# Patient Record
Sex: MALE | Race: WHITE
[De-identification: names, ages, dates, MRNs, and addresses within clinical notes are randomized per-mention and may not be internally consistent; named-entity substitution may affect disease eponyms.]

---

## 2020-06-13 ENCOUNTER — HOSPITAL ENCOUNTER (EMERGENCY)
Dept: HOSPITAL 62 - ER | Age: 35
Discharge: HOME | End: 2020-06-13
Payer: SELF-PAY

## 2020-06-13 VITALS — DIASTOLIC BLOOD PRESSURE: 75 MMHG | SYSTOLIC BLOOD PRESSURE: 123 MMHG

## 2020-06-13 DIAGNOSIS — L03.116: ICD-10-CM

## 2020-06-13 DIAGNOSIS — F17.200: ICD-10-CM

## 2020-06-13 DIAGNOSIS — Z88.0: ICD-10-CM

## 2020-06-13 DIAGNOSIS — F12.10: ICD-10-CM

## 2020-06-13 DIAGNOSIS — L02.413: Primary | ICD-10-CM

## 2020-06-13 LAB
ADD MANUAL DIFF: NO
ALBUMIN SERPL-MCNC: 3.5 G/DL (ref 3.5–5)
ALP SERPL-CCNC: 62 U/L (ref 38–126)
ANION GAP SERPL CALC-SCNC: 4 MMOL/L (ref 5–19)
AST SERPL-CCNC: 40 U/L (ref 17–59)
BASOPHILS # BLD AUTO: 0.1 10^3/UL (ref 0–0.2)
BASOPHILS NFR BLD AUTO: 1.2 % (ref 0–2)
BILIRUB DIRECT SERPL-MCNC: 0.1 MG/DL (ref 0–0.4)
BILIRUB SERPL-MCNC: 0.4 MG/DL (ref 0.2–1.3)
BUN SERPL-MCNC: 9 MG/DL (ref 7–20)
CALCIUM: 8.8 MG/DL (ref 8.4–10.2)
CHLORIDE SERPL-SCNC: 106 MMOL/L (ref 98–107)
CO2 SERPL-SCNC: 27 MMOL/L (ref 22–30)
EOSINOPHIL # BLD AUTO: 0.4 10^3/UL (ref 0–0.6)
EOSINOPHIL NFR BLD AUTO: 3.7 % (ref 0–6)
ERYTHROCYTE [DISTWIDTH] IN BLOOD BY AUTOMATED COUNT: 12.9 % (ref 11.5–14)
GLUCOSE SERPL-MCNC: 84 MG/DL (ref 75–110)
HCT VFR BLD CALC: 43 % (ref 37.9–51)
HGB BLD-MCNC: 14.6 G/DL (ref 13.5–17)
LYMPHOCYTES # BLD AUTO: 2.3 10^3/UL (ref 0.5–4.7)
LYMPHOCYTES NFR BLD AUTO: 20.6 % (ref 13–45)
MCH RBC QN AUTO: 31.9 PG (ref 27–33.4)
MCHC RBC AUTO-ENTMCNC: 33.9 G/DL (ref 32–36)
MCV RBC AUTO: 94 FL (ref 80–97)
MONOCYTES # BLD AUTO: 0.9 10^3/UL (ref 0.1–1.4)
MONOCYTES NFR BLD AUTO: 7.8 % (ref 3–13)
NEUTROPHILS # BLD AUTO: 7.4 10^3/UL (ref 1.7–8.2)
NEUTS SEG NFR BLD AUTO: 66.7 % (ref 42–78)
PLATELET # BLD: 299 10^3/UL (ref 150–450)
POTASSIUM SERPL-SCNC: 4.2 MMOL/L (ref 3.6–5)
PROT SERPL-MCNC: 6.3 G/DL (ref 6.3–8.2)
RBC # BLD AUTO: 4.56 10^6/UL (ref 4.35–5.55)
TOTAL CELLS COUNTED % (AUTO): 100 %
WBC # BLD AUTO: 11.1 10^3/UL (ref 4–10.5)

## 2020-06-13 PROCEDURE — 83605 ASSAY OF LACTIC ACID: CPT

## 2020-06-13 PROCEDURE — 85025 COMPLETE CBC W/AUTO DIFF WBC: CPT

## 2020-06-13 PROCEDURE — 93971 EXTREMITY STUDY: CPT

## 2020-06-13 PROCEDURE — 36415 COLL VENOUS BLD VENIPUNCTURE: CPT

## 2020-06-13 PROCEDURE — 87040 BLOOD CULTURE FOR BACTERIA: CPT

## 2020-06-13 PROCEDURE — 99283 EMERGENCY DEPT VISIT LOW MDM: CPT

## 2020-06-13 PROCEDURE — 96375 TX/PRO/DX INJ NEW DRUG ADDON: CPT

## 2020-06-13 PROCEDURE — 80053 COMPREHEN METABOLIC PANEL: CPT

## 2020-06-13 PROCEDURE — 96365 THER/PROPH/DIAG IV INF INIT: CPT

## 2020-06-13 NOTE — ER DOCUMENT REPORT
ED Skin Rash/Insect Bite/Abscs





- General


Chief Complaint: Abscess


Stated Complaint: ABSCESS/RIGHT ARM, LEFT LEG


Time Seen by Provider: 06/13/20 15:50


Primary Care Provider: 


[facility] [Provider Group] - Follow up in 3-5 days (Call for 

recheck appointment in 2 days)


Mode of Arrival: Ambulatory


Information source: Patient


Notes: 





34-year-old male past medical history significant for substance abuse presents 

to the emergency room complaining of erythematous lesion to his right forearm 

that he noticed 4 days ago.  2 days ago he noticed a few lesions to his left leg

with some left leg swelling.  States the areas are painful.  He denies any 

trauma or injury.  Denies any IV drug use denies any fevers.  Tetanus is 

up-to-date.  No medications for symptoms.  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

section of a hotel and unsure if he may have been bitten by something while he 

was there.





- Related Data


Allergies/Adverse Reactions: 


                                        





Penicillins Allergy (Verified 06/13/20 15:56)


   











Past Medical History





- General


Information source: Patient





- Social History


Smoking Status: Current Every Day Smoker


Chew tobacco use (# tins/day): No


Frequency of alcohol use: Social


Drug Abuse: Marijuana, Other - History of narcotic abuse


Lives with: Family


Family History: Reviewed & Not Pertinent


Patient has homicidal ideation: No


Past Surgical History: Reports: Hx Orthopedic Surgery - back surgery





Review of Systems





- Review of Systems


Constitutional: No symptoms reported


Cardiovascular: No symptoms reported


Respiratory: No symptoms reported


Musculoskeletal: Muscle pain


Skin: Lesions, Other - Erythema


Neurological/Psychological: No symptoms reported


-: Yes All other systems reviewed and negative





Physical Exam





- Vital signs


Vitals: 


                                        











Temp Pulse Resp BP Pulse Ox


 


 97.7 F   58 L  16   126/85 H  100 


 


 06/13/20 15:49  06/13/20 15:49  06/13/20 15:49  06/13/20 15:49  06/13/20 15:49














- General


General appearance: Appears well, Alert


In distress: Mild





- Respiratory


Respiratory status: No respiratory distress


Chest status: Nontender


Breath sounds: Normal


Chest palpation: Normal





- Cardiovascular


Rhythm: Bradycardia


Heart sounds: Normal auscultation


Murmur: No





- Extremities


General upper extremity: Tender - Right forearm with a 3 cm nonfluctuant abscess

that is tender to palpation.  Warm to touch.  There is no active discharge or 

draining noted.


General lower extremity: Tender, Edema - Left lower calf with mild edema.  There

are 3 less than 1/4 cm areas of erythema questionable bites.  Warm and tender to

palpation without active discharge or draining noted.





- Neurological


Neuro grossly intact: Yes


Cognition: Normal


Orientation: AAOx4


Mario Coma Scale Eye Opening: Spontaneous


Hale Coma Scale Verbal: Oriented


Hale Coma Scale Motor: Obeys Commands


Hale Coma Scale Total: 15


Speech: Normal


Motor strength normal: LUE, RUE, LLE, RLE


Sensory: Normal





- Skin


Skin Temperature: Warm


Skin Moisture: Dry


Skin Color: Erythema


Skin irregularity: Abscess, Erythema


Location of irregularity: Extremities - Right forearm with a nonfluctuant 3 cm 

abscess that is warm and tender to palpation without active discharge or 

draining noted.  Left lower extremity with 3 less than 1/4 cm areas of erythema 

that are warm and tender to palpation without discharge or draining.  There is 

trace pedal edema to the left foot.


Irregularity with: Swelling, Tenderness, Warmth, Inflammation





Course





- Re-evaluation


Re-evalutation: 





06/13/20 18:42


Patient is resting comfortably reviewed all test results with patient.  Abscess 

to right forearm is nonfluctuant not able to be drained at this time.  Area was 

marked with a pen.  Patient has 3 small questionable bites to his left lower 

calf.  Not drainable.  Patient was counseled to take antibiotics as prescribed. 

Warm compresses 20 minutes 3 times a day.  Return if not improving in 2 days 

sooner if area of erythema expands beyond the markings.  Can take Tylenol and/or

Motrin as needed for pain.  Patient was given strict return to the emergency 

room guidelines.  Return for any new or worsening symptoms.  All questions were 

answered.  Patient verbalized understanding and agrees with plan of care.





- Vital Signs


Vital signs: 


                                        











Temp Pulse Resp BP Pulse Ox


 


 97.7 F   58 L  16   126/85 H  100 


 


 06/13/20 15:50  06/13/20 15:49  06/13/20 15:49  06/13/20 15:49  06/13/20 15:49














- Laboratory


Result Diagrams: 


                                 06/13/20 16:20





                                 06/13/20 16:20


Laboratory results interpreted by me: 


                                        











  06/13/20 06/13/20





  16:20 16:20


 


WBC  11.1 H 


 


Anion Gap   4 L














- Diagnostic Test


Radiology reviewed: Reports reviewed





Discharge





- Discharge


Clinical Impression: 


 Abscess of right forearm, Left leg cellulitis





Condition: Stable


Disposition: HOME, SELF-CARE


Instructions:  Abscess ([facility]), Cellulitis ([facility])


Additional Instructions: 


The erythema is likely due to infection of your skin.  You need to take the 

antibiotics as prescribed.  Do not stop even if the lesions go away until you 

have completed all the antibiotics.   You should also return if you develop 

fevers with temperature greater than 101, persistent vomiting, worsening pain, 

or have any other symptoms that are concerning to you. Follow-up with your 

doctor in the next 24-48 hours.


You were seen for an abscess that did not require drainage. Please clean this 

area with soap and water twice daily . Please return if you develop fever, 

vomiting, the pain at the site worsens, you notice spreading redness from the 

area, or you have any other symptoms that are concerning to you.


Can take Tylenol and or Motrin as needed for pain.


Prescriptions: 


Clindamycin HCl 300 mg PO QID #40 capsule


Referrals: 


[facility] [Provider Group] - Follow up in 3-5 days (Call for 

recheck appointment in 2 days)

## 2020-06-13 NOTE — RADIOLOGY REPORT (SQ)
EXAM DESCRIPTION:  VENOUS UNILATERAL LOWER



IMAGES COMPLETED DATE/TIME:  6/13/2020 5:27 pm



REASON FOR STUDY:  Swelling left lower leg recent trip from [place]



COMPARISON:  None.



TECHNIQUE:  Dynamic and static gray scale and color images acquired of the left leg venous system. Se
lected spectral images acquired with additional compression and augmentation maneuvers. The contralat
eral common femoral vein and saphenofemoral junction were also imaged. Images stored on PACS.



LIMITATIONS:  None.



FINDINGS:  COMMON FEMORAL: Normal phasicity, compression and augmentation. No visualized echogenic ma
terial on gray scale. No defects on color images.

FEMORAL: Normal compression and augmentation. No visualized echogenic material on gray scale. No defe
cts on color images.

POPLITEAL: Normal compression, augmentation. No visualized echogenic material on gray scale. No defec
ts on color images.

CALF VESSELS: Normal compression, augmentation. No visualized echogenic material on gray scale. No de
fects on color images.

GSV: Normal compression, augmentation. No visualized echogenic material on gray scale. No defects on 
color images.

ANY DEEP VENOUS INSUFFICIENCY: Not evaluated.

ANY EVIDENCE OF POPLITEAL CYST: No.

OTHER: No other significant finding.

CONTRALATERAL COMMON FEMORAL VEIN AND SAPHENOFEMORAL JUNCTION:

Normal phasicity, compression and augmentation. No visualized echogenic material on gray scale. No de
fects on color images.



IMPRESSION:  NO DEEP VEIN THROMBOSIS WITHIN THE RIGHT LOWER EXTREMITY.



TECHNICAL DOCUMENTATION:  JOB ID:  [id number]

 2011 [employer]- All Rights Reserved



Reading location - IP/workstation name: [email or web address]

## 2020-06-13 NOTE — ER DOCUMENT REPORT
ED Medical Screen (RME)





- General


Chief Complaint: Abscess


Stated Complaint: ABSCESS/RIGHT ARM, LEFT LEG


Time Seen by Provider: 06/13/20 15:50


Mode of Arrival: Ambulatory


Information source: Patient


Notes: 





34-year-old male presented to ED for an abscess to his right forearm and his 

left lower leg swelling ankle swelling.  He states he just came to [place] 

[place] from [place].  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 

dirty.  He states he does have some sores on his left leg as well.  He is alert 

oriented respirations regular and unlabored speaking in full sentences.  He 

states he does get frequent sores on his back but never one that is gotten this 

big.











I have greeted and performed a rapid initial assessment of this patient.  A 

comprehensive ED assessment and evaluation of the patient, analysis of test 

results and completion of medical decision making process will be conducted by 

an additional ED providers.





Past Medical History





- Social History


Chew tobacco use (# tins/day): No


Frequency of alcohol use: Social


Drug Abuse: Marijuana





Physical Exam





- Vital signs


Vitals: 





                                        











Temp


 


 97.7 F 


 


 06/13/20 15:50














Course





- Vital Signs


Vital signs: 





                                        











Temp Pulse Resp BP Pulse Ox


 


 97.7 F             


 


 06/13/20 15:50